# Patient Record
(demographics unavailable — no encounter records)

---

## 2018-06-05 NOTE — EMERGENCY DEPARTMENT REPORT
ED Rash HPI





- HPI


Chief Complaint: Skin Rash


Stated Complaint: RASH OVER BODY


Time Seen by Provider: 06/05/18 23:40


Duration: 2 Days


Location: Chest, Back, Abdomen, Upper Extremities, Lower Extremities


Suspected Cause: Unknown


Rash Symptoms: Yes Itching (at rest site, red rash), No Facial Swelling, No 

Tongue/Oral Swelling, No Breathing Difficulties, No Choking Sensation, No 

Wheezing/Dyspnea, No Peeling, No Blistering, No Fever, No Lightheaded, No 

Malaise, No Myalgias


Severity: Unable to Determine


Other History: Mom brought 5-year-old male child here for that rash has been 

all over his body to include back chest abdomen of her lower extremities and 

the patient is itchy.  She said this is going on for 2 days and she is given 

patient [facility] brand Benadryl but is not helping.  Denies patient's complain 

of any pain.  Denies recent fever or chills, nausea or vomiting, wheezing, 

stridor, cough or difficulty breathing.  Denies any generalized or complaint of 

sore throat.  Denies any nasal congestion or runny nose.  Denies patient with 

any medical problems and she is unaware of anything new in child's environment.

  No excabating or alleviating factor.  Immunizations up-to-date her family





ED Review of Systems


ROS: 


Stated complaint: RASH OVER BODY


Other details as noted in HPI





Constitutional: denies: chills, fever


Eyes: denies: eye pain, eye discharge, vision change


ENT: denies: ear pain, throat pain, congestion


Respiratory: denies: cough, shortness of breath, SOB with exertion, SOB at rest

, stridor, wheezing


Cardiovascular: denies: chest pain, palpitations, edema, syncope


Gastrointestinal: nausea.  denies: abdominal pain, vomiting, diarrhea, 

constipation, hematemesis, melena, hematochezia


Genitourinary: denies: hematuria


Musculoskeletal: denies: back pain, joint swelling, arthralgia, myalgia


Skin: rash, pruritus.  denies: lesions


Neurological: denies: headache, weakness





ED Past Medical Hx





- Past Medical History


Previous Medical History?: No





- Surgical History


Past Surgical History?: No





- Family History


Family history: hypertension





- Social History


Smoking Status: Never Smoker


Substance Use Type: None


Other Social History: 





Child lives with family





- Medications


Home Medications: 


 Home Medications











 Medication  Instructions  Recorded  Confirmed  Last Taken  Type


 


diphenhydrAMINE [Benadryl CAP] 25 mg PO Q8HR PRN #12 capsule 06/06/18  Unknown 

Rx


 


prednisoLONE [Prednisolone] 15 ml PO QAM 5 Days #75 solution 06/06/18  Unknown 

Rx














Rash Exam





- Exam


General: 


Vital signs noted. No distress. Alert and acting appropriately.


This is a 5-year-old male child well-nourished well-developed in no acute 

distress.  Child is nontoxic in appearance


HEENT: No Periorbital Edema, No Conjuctival Injection, No Chemosis, No Perioral 

Edema, No Tongue Edema, No Uvular Edema, No Compromised Airway, No Drooling


Lungs: Yes Good Air Exchange (CTAB), Yes Other Abnormal Lung Sounds, No Wheezes

, No Ronchi, No Stridor, No Cough, No Labored Respirations, No Retractions, No 

Use of Accessory Muscles


Heart: Yes Regular (S1, S2.  Regular rate and rhythm), No Murmur


Skin: Yes Urticarial Rash (noted to anterior and posterior torso, upper and 

lower extremities), Yes Tenderness, Yes Erythema (anterior and posterior torso 

and upper and lower extremities), No Maculopapular Rash, No Morbilliform rash, 

No Bulla(e), No Excoriations, No Weeping, No Edema, No Encrustations, No Other


Other: Positive: Abdomen Normal (nontender to palpate in all quadrants, normal 

bowel sounds), Neurologic Normal (shin alert and oriented and appropriate for 

age.), Musculoskeletal Normal (no clubbing, cyanosis or edema.  +2 pulses to 

all extremities)





ED Course


 Vital Signs











  06/05/18





  16:05


 


Temperature 98.6 F


 


Pulse Rate 105


 


Respiratory 18 L





Rate 


 


Blood Pressure 103/65


 


O2 Sat by Pulse 100





Oximetry 














- Reevaluation(s)


Reevaluation #1: 





06/05/18 23:56


Patient ordered Orapred 50 mg by mouth and Benadryl 25 mg by mouth and we will 

reevaluate.








Reevaluation #2: 





06/06/18 00:28


Patient spit out Orapred therefore he'll get Decadron 8 mg IM.  And we'll 

reevaluate


06/06/18 00:57





Reevaluation #3: 





06/06/18 00:57


Rashes subsided and itching is better.  Patient's, and nontoxic and he is 

stable.  No respiratory symptoms.





ED Medical Decision Making





- Medical Decision Making





ED course:





Mom brought patient emergency room report patient with rash all over his body 

since Sunday which was 2 days ago.  She says she gave patient [facility] for him 

Benadryl but it's not helping.  She reports the patient is itching.  Patient 

denies any pain.  Mom denies patient fever or chills or any respiratory 

symptoms.  She is here to have patient checked.





She was seen and evaluated by myself and is stable.  He is noted to have rash 

on his anterior and posterior torso, upper and lower extremity that is 

urticarial in nature and noted to be itching.  He has no respiratory 

involvement and vital signs stable and is afebrile.  Patient was given 

medication in emergency room and observed and now  itching and rash is 

subsiding.  I discussed with mom diagnosis, treatment plan and she voiced 

understanding.





A/P


1: Urticaria-unknown cause.  Patient is given Orapred 50 mg by mouth which he  

threw  up therefore he was given Decadron 8 mg IM and rash is better and plan 

to discharge home on Orapred


2: Pruritus: Patient given Benadryl 25 mg by mouth and itching is better and 

plan to discharge home and Benadryl.





Prescription for Orapred and Benadryl given to mom


Referral to dermatologist and to follow-up with pediatrician tomorrow.


I discussed with her the child will need to have allergy testing and 

pediatrician will need to refer or she can take child to dermatologist to get 

this that she voiced understanding


Parents educated on diagnosis, medication, follow-up and she voiced 

understanding





Patient discharged home in stable condition with family member, vital signs are 

stable he is afebrile, rashes subsiding and nontoxic in appearance..  

Understanding the child needs to be seen by pediatrician tomorrow and to be 

taken to dermatologist for allergy testing.





Critical care attestation.: 


If time is entered above; I have spent that time in minutes in the direct care 

of this critically ill patient, excluding procedure time.








ED Disposition


Clinical Impression: 


 Urticaria, Pruritic condition





Disposition: DC-01 TO HOME OR SELFCARE


Is pt being admited?: No


Does the pt Need Aspirin: No


Condition: Stable


Instructions:  Urticaria (ED), Itchy Skin (ED)


Additional Instructions: 


Take child to pediatrician later today for follow-up visit rash.


Return to emergency room if child's rash returned or worsens.  Return if child 

developed cough, fever, chills, vomiting, wheezing, stridor or chest tightness.


Please give child Orapred and Benadryl as ordered and knows that Benadryl will 

make child drowsy but will help to relieve rash and itching.


Take child to dermatologist to get allergy tested to see what he is allergic to.


Prescriptions: 


diphenhydrAMINE [Benadryl CAP] 25 mg PO Q8HR PRN #12 capsule


 PRN Reason: rash and itching


prednisoLONE [Prednisolone] 15 ml PO QAM 5 Days #75 solution


Referrals: 


MACO AGUIRRE MD [Primary Care Provider] - 06/06/18


MARIAN KIRKLAND MD [Staff Physician] - 06/08/18


Forms:  Accompanied Note, Work/School Release Form(ED)